# Patient Record
Sex: MALE | Race: WHITE | NOT HISPANIC OR LATINO | Employment: UNEMPLOYED | URBAN - METROPOLITAN AREA
[De-identification: names, ages, dates, MRNs, and addresses within clinical notes are randomized per-mention and may not be internally consistent; named-entity substitution may affect disease eponyms.]

---
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
## 2024-05-28 ENCOUNTER — TELEMEDICINE (OUTPATIENT)
Dept: DERMATOLOGY | Facility: CLINIC | Age: 19
End: 2024-05-28
Payer: COMMERCIAL

## 2024-05-28 DIAGNOSIS — T07.XXXA MULTIPLE EXCORIATIONS: Primary | ICD-10-CM

## 2024-05-28 DIAGNOSIS — L21.9 SEBORRHEIC DERMATITIS: ICD-10-CM

## 2024-05-28 PROCEDURE — 99214 OFFICE O/P EST MOD 30 MIN: CPT | Performed by: STUDENT IN AN ORGANIZED HEALTH CARE EDUCATION/TRAINING PROGRAM

## 2024-05-28 RX ORDER — CLINDAMYCIN AND BENZOYL PEROXIDE 10; 50 MG/G; MG/G
GEL TOPICAL
COMMUNITY
Start: 2024-05-10

## 2024-05-28 RX ORDER — BUTYROSPERMUM PARKII(SHEA BUTTER), SIMMONDSIA CHINENSIS (JOJOBA) SEED OIL, ALOE BARBADENSIS LEAF EXTRACT .01; 1; 3.5 G/100G; G/100G; G/100G
LIQUID TOPICAL
COMMUNITY
Start: 2022-07-20

## 2024-05-28 RX ORDER — FERROUS SULFATE 143(45) MG
TABLET, EXTENDED RELEASE ORAL
COMMUNITY
Start: 2022-07-20

## 2024-05-28 NOTE — PROGRESS NOTES
"[facility] Dermatology Clinic Note     Patient Name: Kingsley Hou  Encounter Date: 5/28/2024     Have you been cared for by a [facility] Dermatologist in the last 3 years and, if so, which description applies to you?    NO.   I am considered a \"new\" patient and must complete all patient intake questions. I am MALE/not capable of bearing children.    REVIEW OF SYSTEMS:  Have you recently had or currently have any of the following? Recent fever or chills? No  Any non-healing wound? No   PAST MEDICAL HISTORY:  Have you personally ever had or currently have any of the following?  If \"YES,\" then please provide more detail. Skin cancer (such as Melanoma, Basal Cell Carcinoma, Squamous Cell Carcinoma?  No  Tuberculosis, HIV/AIDS, Hepatitis B or C: No  Radiation Treatment No   HISTORY OF IMMUNOSUPPRESSION:   Do you have a history of any of the following:  Systemic Immunosuppression such as Diabetes, Biologic or Immunotherapy, Chemotherapy, Organ Transplantation, Bone Marrow Transplantation?  No     Answering \"YES\" requires the addition of the dotphrase \"IMMUNOSUPPRESSED\" as the first diagnosis of the patient's visit.   FAMILY HISTORY:  Any \"first degree relatives\" (parent, brother, sister, or child) with the following?    Skin Cancer, Pancreatic or Other Cancer? No   PATIENT EXPERIENCE:    Do you want the Dermatologist to perform a COMPLETE skin exam today including a clinical examination under the \"bra and underwear\" areas?  NO  If necessary, do we have your permission to call and leave a detailed message on your Preferred Phone number that includes your specific medical information?  Yes      No Known Allergies   Current Outpatient Medications:     Multiple Vitamins-Minerals (MULTIVITAMIN WITH MINERALS) tablet, Take 1 tablet by mouth daily, Disp: , Rfl:           Whom besides the patient is providing clinical information about today's encounter?   NO ADDITIONAL HISTORIAN (patient alone provided history)  Parent/Guardian " provided history (due to age/developmental stage of patient)    Physical Exam and Assessment/Plan by Diagnosis:    ANDREW'S NEVUS OR excoriations    Physical Exam:  Anatomic Location Affected:  Shoulder, scapula - left side only  Morphological Description:  exam limited given telemedicine appointment but increased density of terminal hairs and excoriations visible  Pertinent Positives:  Pertinent Negatives:    Additional History of Present Condition:  The patient and his father states that he has been dealing with the acne for years. He has been using topical Benzaclin gel twice a day for the past 3 weeks without notable progress. The acne is on shoulder/scapula area. Also notes increased hair growth with discoloration of skin.    Assessment and Plan:   - Will schedule for in person evaluation given concerns. Discussed that increased hair growth with some discoloration of skin overlying a particular area might be consistent with a Andrew's nevus but in person evaluation would be needed to assist in this diagnosis. Otherwise, findings appear most consistent with excoriations. Therefore will treat as such today but will evaluate in person to confirm no additional pathology  Based on a thorough discussion of this condition and the management approach to it (including a comprehensive discussion of the known risks, side effects and potential benefits of treatment), the patient (family) agrees to implement the following specific plan:  Keep nails cut short  Wear cotton gloves and t shirt at night  Will evaluate in person      SEBORRHEIC DERMATITIS     Physical Exam:  Anatomic Location Affected &  Morphological   Description:  mild scaling per father and patient  Pertinent Positives:  Pertinent Negatives:    Additional History of Present Condition:    Attempted treatments:none    Assessment and Plan:  History and physical exam most consistent with seborrheic dermatitis  The chronic nature of this condition and association  with normal skin yeast were reviewed.   Educated that the goal of therapy is to control symptoms, but this is not typically something we cure and intermittent flares can be expected.  Based on a thorough discussion of this condition and the management approach to it (including a comprehensive discussion of the known risks, side effects and potential benefits of treatment), the patient (family) agrees to implement the following specific plan:           SCALP  Start OTC anti-dandruff shampoo (Selsun Blue, Nizoral) 2x/week to damp scalp, let sit 5-10 minutes then rinse (may use normal shampoo/conditioner thereafter as desired)               Virtual Regular Visit    Verification of patient location:    Patient is located at Home in the following state in which I hold an active license [place]      Assessment/Plan:    Problem List Items Addressed This Visit    None  Visit Diagnoses       Acne vulgaris    -  Primary                 Reason for visit is   Chief Complaint   Patient presents with    Virtual Regular Visit          Encounter provider Patrice Verdugo MD      Recent Visits  No visits were found meeting these conditions.  Showing recent visits within past 7 days and meeting all other requirements  Today's Visits  Date Type Provider Dept   05/28/24 Telemedicine Patrice Verdugo MD  Dermatology [facility]   Showing today's visits and meeting all other requirements  Future Appointments  No visits were found meeting these conditions.  Showing future appointments within next 150 days and meeting all other requirements       The patient was identified by name and date of birth. Kingsley Hou was informed that this is a telemedicine visit and that the visit is being conducted through the Epic Embedded platform. He agrees to proceed..  My office door was closed. The patient was notified the following individuals were present in the room YARIEL Abraham.  He acknowledged consent and understanding of privacy and  security of the video platform. The patient has agreed to participate and understands they can discontinue the visit at any time.    Patient is aware this is a billable service.     Subjective  Kingsley Hou is a 19 y.o. male presenting for multiple concerns .      HPI     Past Medical History:   Diagnosis Date    Autism        No past surgical history on file.    Current Outpatient Medications   Medication Sig Dispense Refill    Multiple Vitamins-Minerals (MULTIVITAMIN WITH MINERALS) tablet Take 1 tablet by mouth daily       No current facility-administered medications for this visit.        No Known Allergies    Review of Systems    Video Exam    There were no vitals filed for this visit.    Physical Exam     Visit Time  Total Visit Duration: 15 mins    Scribe Attestation      I,:  Estelle Davis am acting as a scribe while in the presence of the attending physician.:       I,:  Patrice Verdugo MD personally performed the services described in this documentation    as scribed in my presence.:

## 2024-07-10 ENCOUNTER — OFFICE VISIT (OUTPATIENT)
Dept: DERMATOLOGY | Facility: CLINIC | Age: 19
End: 2024-07-10
Payer: COMMERCIAL

## 2024-07-10 DIAGNOSIS — D22.5 BECKER'S NEVUS: ICD-10-CM

## 2024-07-10 DIAGNOSIS — L21.9 SEBORRHEIC DERMATITIS: Primary | ICD-10-CM

## 2024-07-10 PROCEDURE — 99214 OFFICE O/P EST MOD 30 MIN: CPT | Performed by: STUDENT IN AN ORGANIZED HEALTH CARE EDUCATION/TRAINING PROGRAM

## 2024-07-10 RX ORDER — KETOCONAZOLE 20 MG/ML
SHAMPOO TOPICAL
Qty: 120 ML | Refills: 6 | Status: SHIPPED | OUTPATIENT
Start: 2024-07-10

## 2024-07-10 NOTE — PROGRESS NOTES
"[facility] Dermatology Clinic Note     Patient Name: Kingsley Hou  Encounter Date: 07/10/2024     Have you been cared for by a [facility] Dermatologist in the last 3 years and, if so, which description applies to you?    Yes.  I have been here within the last 3 years, and my medical history has NOT changed since that time.  I am MALE/not capable of bearing children.    REVIEW OF SYSTEMS:  Have you recently had or currently have any of the following? No changes in my recent health.   PAST MEDICAL HISTORY:  Have you personally ever had or currently have any of the following?  If \"YES,\" then please provide more detail. No changes in my medical history.   HISTORY OF IMMUNOSUPPRESSION: Do you have a history of any of the following:  Systemic Immunosuppression such as Diabetes, Biologic or Immunotherapy, Chemotherapy, Organ Transplantation, Bone Marrow Transplantation?  No     Answering \"YES\" requires the addition of the dotphrase \"IMMUNOSUPPRESSED\" as the first diagnosis of the patient's visit.   FAMILY HISTORY:  Any \"first degree relatives\" (parent, brother, sister, or child) with the following?    No changes in my family's known health.   PATIENT EXPERIENCE:    Do you want the Dermatologist to perform a COMPLETE skin exam today including a clinical examination under the \"bra and underwear\" areas?  NO  If necessary, do we have your permission to call and leave a detailed message on your Preferred Phone number that includes your specific medical information?  Yes      No Known Allergies   Current Outpatient Medications:     Cholecalciferol (D3 2000) 50 MCG (2000 UT) CAPS, , Disp: , Rfl:     clindamycin-benzoyl peroxide (BENZACLIN) gel, , Disp: , Rfl:     Ferrous Sulfate Dried ER (SM Slow Release Iron) 143 (45 Fe) MG TBCR, , Disp: , Rfl:     Multiple Vitamins-Minerals (MULTIVITAMIN WITH MINERALS) tablet, Take 1 tablet by mouth daily, Disp: , Rfl:           Whom besides the patient is providing clinical information about " today's encounter?   Parent/Guardian provided history (due to age/developmental stage of patient)    Physical Exam and Assessment/Plan by Diagnosis:    ANDREW'S NEVUS     Physical Exam:  Anatomic Location Affected:  Shoulder, scapula- left side only   Morphological Description:  Large brown patch with feathered borders and hypertrichosis and scattered pink macules at sites of prior acneiform lesions  Pertinent Positives:  Pertinent Negatives: No breast hypoplasia or limb abnormalities    Additional History of Present Condition:  patient is present with mother for in person evaluation of lesion discussed on telemedicine.     Assessment and Plan:  - Clinical findings are highly consistent with a Andrew's nevus.   - Educated that this is associated with soft tissue hamartomas which can lead to the elevated feeling of the lesion   - Educated that these can become more notable during puberty with terminal hairs becoming visible  - Educated that the lesion is not concerning and can be monitored like any other pigmented area on the body  - Educated on hair removal laser if desired  Based on a thorough discussion of this condition and the management approach to it (including a comprehensive discussion of the known risks, side effects and potential benefits of treatment), the patient (family) agrees to implement the following specific plan:  Reassured benign      SEBORRHEIC DERMATITIS     Physical Exam:  Anatomic Location Affected &  Morphological Description:  Greasy adherent scale/scaling on the scalp  Pertinent Positives:  Pertinent Negatives:        Assessment and Plan:  History and physical exam most consistent with seborrheic dermatitis  The chronic nature of this condition and association with normal skin yeast were reviewed.   Educated that the goal of therapy is to control symptoms, but this is not typically something we cure and intermittent flares can be expected.  Based on a thorough discussion of this condition  and the management approach to it (including a comprehensive discussion of the known risks, side effects and potential benefits of treatment), the patient (family) agrees to implement the following specific plan:           SCALP  Start  ketoconazole shampoo 3x weekly (lather for 5 minutes prior to rinsing off; prescribed today)        Scribe Attestation      I,:  Ayaka Martines am acting as a scribe while in the presence of the attending physician.:       I,:  Patrice Verdugo MD personally performed the services described in this documentation    as scribed in my presence.:

## 2024-07-10 NOTE — PATIENT INSTRUCTIONS
LUNDBERG'S NEVUS     Assessment and Plan:  Based on a thorough discussion of this condition and the management approach to it (including a comprehensive discussion of the known risks, side effects and potential benefits of treatment), the patient (family) agrees to implement the following specific plan:  Reassured benign      SEBORRHEIC DERMATITIS     Physical Exam:  Anatomic Location Affected &  Morphological Description:  Greasy adherent scale/scaling plaques on the scalp  Pertinent Positives:  Pertinent Negatives:    Additional History of Present Condition:      Attempted treatments:none    Assessment and Plan:  History and physical exam most consistent with seborrheic dermatitis  The chronic nature of this condition and association with normal skin yeast were reviewed.   Educated that the goal of therapy is to control symptoms, but this is not typically something we cure and intermittent flares can be expected.  Based on a thorough discussion of this condition and the management approach to it (including a comprehensive discussion of the known risks, side effects and potential benefits of treatment), the patient (family) agrees to implement the following specific plan:           SCALP  Start OTC anti-dandruff shampoo (Head & Shoulders, Selsun Blue, Neutrogena T-gel or T-Sal) 2x/week to damp scalp, let sit 10 minutes then rinse (may use normal shampoo/conditioner thereafter as desired) and alternate with ketoconazole shampoo (lather for 5 minutes prior to rinsing off; prescribed today)